# Patient Record
Sex: FEMALE | Race: WHITE | NOT HISPANIC OR LATINO | Employment: PART TIME | URBAN - METROPOLITAN AREA
[De-identification: names, ages, dates, MRNs, and addresses within clinical notes are randomized per-mention and may not be internally consistent; named-entity substitution may affect disease eponyms.]

---
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
## 2023-04-09 ENCOUNTER — OFFICE VISIT (OUTPATIENT)
Dept: URGENT CARE | Facility: PHYSICIAN GROUP | Age: 23
End: 2023-04-09
Payer: OTHER GOVERNMENT

## 2023-04-09 VITALS
RESPIRATION RATE: 18 BRPM | HEIGHT: 64 IN | TEMPERATURE: 98.8 F | WEIGHT: 165 LBS | OXYGEN SATURATION: 97 % | BODY MASS INDEX: 28.17 KG/M2 | DIASTOLIC BLOOD PRESSURE: 62 MMHG | SYSTOLIC BLOOD PRESSURE: 120 MMHG | HEART RATE: 100 BPM

## 2023-04-09 DIAGNOSIS — J02.9 PHARYNGITIS, UNSPECIFIED ETIOLOGY: ICD-10-CM

## 2023-04-09 LAB — S PYO DNA SPEC NAA+PROBE: NOT DETECTED

## 2023-04-09 PROCEDURE — 87651 STREP A DNA AMP PROBE: CPT | Performed by: PHYSICIAN ASSISTANT

## 2023-04-09 PROCEDURE — 99213 OFFICE O/P EST LOW 20 MIN: CPT | Performed by: PHYSICIAN ASSISTANT

## 2023-04-09 RX ORDER — AMOXICILLIN 875 MG/1
875 TABLET, COATED ORAL 2 TIMES DAILY
COMMUNITY
Start: 2023-01-20

## 2023-04-09 RX ORDER — DEXAMETHASONE SODIUM PHOSPHATE 10 MG/ML
10 INJECTION INTRAMUSCULAR; INTRAVENOUS ONCE
Status: COMPLETED | OUTPATIENT
Start: 2023-04-09 | End: 2023-04-09

## 2023-04-09 RX ADMIN — DEXAMETHASONE SODIUM PHOSPHATE 10 MG: 10 INJECTION INTRAMUSCULAR; INTRAVENOUS at 14:36

## 2023-04-09 ASSESSMENT — ENCOUNTER SYMPTOMS
ABDOMINAL PAIN: 0
EYE DISCHARGE: 0
COUGH: 0
VOMITING: 0
SORE THROAT: 1
DIARRHEA: 0
DIZZINESS: 0
CONSTIPATION: 0
EYE PAIN: 0
WHEEZING: 0
FEVER: 0
NAUSEA: 0
SHORTNESS OF BREATH: 0
CHILLS: 0
SINUS PAIN: 0
DIAPHORESIS: 0
EYE REDNESS: 0
HEADACHES: 0

## 2023-04-09 NOTE — PROGRESS NOTES
"  Subjective:     Adwoa Clements  is a 23 y.o. female who presents for Pharyngitis (X 3 days sore throat, white patches on throat, low grade fever)       She presents today with pharyngitis symptoms have been ongoing for last 3 days.  Has been experiencing a low-grade fever of  and symptom onset.  It was noted by her mother that there were exudates on the tonsils which did prompt the patient to come in for a strep test today.  Denies any recent close sick contacts.  No chest pain or shortness breath, no nausea or vomiting, no abdominal pain, no diarrhea.     Review of Systems   Constitutional:  Negative for chills, diaphoresis, fever and malaise/fatigue.   HENT:  Positive for sore throat. Negative for congestion, ear discharge and sinus pain.    Eyes:  Negative for pain, discharge and redness.   Respiratory:  Negative for cough, shortness of breath and wheezing.    Cardiovascular:  Negative for chest pain.   Gastrointestinal:  Negative for abdominal pain, constipation, diarrhea, nausea and vomiting.   Genitourinary:  Negative for dysuria, frequency and urgency.   Neurological:  Negative for dizziness and headaches.    Allergies   Allergen Reactions    Hydrocodone-Acetaminophen [Hydrocodone-Acetaminophen] Rash     Past Medical History:   Diagnosis Date    ASTHMA         Objective:   /62 (BP Location: Left arm, Patient Position: Sitting, BP Cuff Size: Adult)   Pulse 100   Temp 37.1 °C (98.8 °F)   Resp 18   Ht 1.626 m (5' 4\")   Wt 74.8 kg (165 lb)   SpO2 97%   BMI 28.32 kg/m²   Physical Exam  Vitals and nursing note reviewed.   Constitutional:       General: She is not in acute distress.     Appearance: Normal appearance. She is not ill-appearing, toxic-appearing or diaphoretic.   HENT:      Head: Normocephalic.      Right Ear: Tympanic membrane, ear canal and external ear normal. There is no impacted cerumen.      Left Ear: Tympanic membrane, ear canal and external ear normal. There is no " impacted cerumen.      Nose: No congestion or rhinorrhea.      Mouth/Throat:      Mouth: Mucous membranes are moist.      Pharynx: Posterior oropharyngeal erythema present. No oropharyngeal exudate.   Eyes:      General:         Right eye: No discharge.         Left eye: No discharge.      Conjunctiva/sclera: Conjunctivae normal.   Cardiovascular:      Rate and Rhythm: Normal rate and regular rhythm.   Pulmonary:      Effort: Pulmonary effort is normal. No respiratory distress.      Breath sounds: Normal breath sounds. No stridor. No wheezing or rhonchi.   Musculoskeletal:      Cervical back: Neck supple.   Lymphadenopathy:      Cervical: No cervical adenopathy.   Neurological:      General: No focal deficit present.      Mental Status: She is alert and oriented to person, place, and time.   Psychiatric:         Mood and Affect: Mood normal.         Behavior: Behavior normal.         Thought Content: Thought content normal.         Judgment: Judgment normal.           Diagnostic testing:    Cephid Strep -negative, patient contacted via phone call    Assessment/Plan:     Encounter Diagnoses   Name Primary?    Pharyngitis, unspecified etiology           Plan for care for today's complaint includes 10 mg oral Decadron in office today.  Continue over-the-counter medications for additional symptom support.  Strep test was negative today, no evidence to support antibiotic use at this time.  Continue to monitor symptoms and return to urgent care or follow-up with primary care provider if symptoms remain ongoing.  Follow-up in the emergency department if symptoms become severe, ER precautions discussed in office today..    See AVS Instructions below for written guidance provided to patient on after-visit management and care in addition to our verbal discussion during the visit.    Please note that this dictation was created using voice recognition software. I have attempted to correct all errors, but there may be  sound-alike, spelling, grammar and possibly content errors that I did not discover before finalizing the note.    Antoine Kristyn BARAKAT

## 2023-10-05 ENCOUNTER — GYNECOLOGY VISIT (OUTPATIENT)
Dept: OBGYN | Facility: CLINIC | Age: 23
End: 2023-10-05
Payer: OTHER GOVERNMENT

## 2023-10-05 ENCOUNTER — HOSPITAL ENCOUNTER (OUTPATIENT)
Facility: MEDICAL CENTER | Age: 23
End: 2023-10-05
Attending: STUDENT IN AN ORGANIZED HEALTH CARE EDUCATION/TRAINING PROGRAM
Payer: OTHER GOVERNMENT

## 2023-10-05 VITALS — DIASTOLIC BLOOD PRESSURE: 85 MMHG | BODY MASS INDEX: 27.98 KG/M2 | WEIGHT: 163 LBS | SYSTOLIC BLOOD PRESSURE: 122 MMHG

## 2023-10-05 DIAGNOSIS — Z12.4 SCREENING FOR MALIGNANT NEOPLASM OF CERVIX: ICD-10-CM

## 2023-10-05 DIAGNOSIS — Z01.419 ENCOUNTER FOR GYNECOLOGICAL EXAMINATION (GENERAL) (ROUTINE) WITHOUT ABNORMAL FINDINGS: ICD-10-CM

## 2023-10-05 PROCEDURE — 87491 CHLMYD TRACH DNA AMP PROBE: CPT

## 2023-10-05 PROCEDURE — 88142 CYTOPATH C/V THIN LAYER: CPT

## 2023-10-05 PROCEDURE — 3074F SYST BP LT 130 MM HG: CPT | Performed by: STUDENT IN AN ORGANIZED HEALTH CARE EDUCATION/TRAINING PROGRAM

## 2023-10-05 PROCEDURE — 87591 N.GONORRHOEAE DNA AMP PROB: CPT

## 2023-10-05 PROCEDURE — 3079F DIAST BP 80-89 MM HG: CPT | Performed by: STUDENT IN AN ORGANIZED HEALTH CARE EDUCATION/TRAINING PROGRAM

## 2023-10-05 PROCEDURE — 99385 PREV VISIT NEW AGE 18-39: CPT | Performed by: STUDENT IN AN ORGANIZED HEALTH CARE EDUCATION/TRAINING PROGRAM

## 2023-10-05 NOTE — PROGRESS NOTES
ANNUAL GYNECOLOGY VISIT    Chief Complaint  New Patient      Subjective  Adwoa Clements is a 23 y.o. G0 with LMP 09/18/2023 not using contraception who presents today for Annual Exam.  She has never had a pap before.  She reports regular menses q28 days lasting 3 days with light-moderate flow.  No complaints about abdominal/pelvic cramping.  She is currently in a monogamous relationship and not using condoms but has in the past.  She has completed the HPV vaccination series.    Preventive Care   Immunization History   Administered Date(s) Administered    9VHPV VACCINE 2-3 DOSE IM (GARDASIL 9) 01/18/2018, 07/13/2018    Dtap Vaccine 2001, 2001, 2001, 04/15/2002, 03/09/2005    Hepatitis A Vaccine, Ped/Adol 03/09/2005, 05/31/2006    Hepatitis B Vaccine Adolescent/Pediatric 2001, 2001, 2001    Hib Vaccine (Prp-d) - HISTORICAL DATA 2001, 2001, 2001, 04/15/2002    IPV 2001, 2001, 04/15/2002, 03/09/2005    Influenza Vaccine Adult HD 11/15/2017    Influenza Vaccine Pediatric Split - Historical Data 12/04/2003, 10/15/2004, 11/01/2005, 11/02/2006, 01/15/2009    Influenza Vaccine Quad Inj (Preserved) 10/03/2017    Influenza, Unspecified - HISTORICAL DATA 10/26/2018    MENING VAC SERO B 2 DOSE SCHED IM (BEXSERO) 06/13/2019, 07/13/2019    MMR Vaccine 01/14/2002, 03/09/2005    MODERNA SARS-COV-2 VACCINE (12+) 10/04/2021    Meningococcal Conjugate Vaccine MCV4 (Menactra) 01/18/2018    Tdap Vaccine 07/30/2013    Tuberculin Skin Test 01/19/2019    Varicella Vaccine Live 01/14/2002, 01/15/2008     Last Mammogram: N/A    Gynecology History and ROS  Current Sexual Activity: Yes  History of sexually transmitted diseases?  no  Abnormal discharge? No  Current Contraception:  None    Menstrual History  Patient's last menstrual period was 09/18/2023 (exact date).  Periods are regular  q 28 days, lasting 3 days.   Clots or heavy flow: No  Dysmenorrhea: No  Intermenstrual  bleeding/spotting: No  Significant pain with periods:No  Bothersome PMS symptoms: No  Significant Pelvic Pain: No      Pap History  Last pap smear: Never - due today  History of moderate or severe dysplasia: No    Cancer Risk Assessement:  Family history of:   - Breast cancer: yes - grandmother diagnosed 3 months ago, genetic testing pending   - Ovarian cancer: no   - Uterine cancer: no   - Colon cancer: no    Obstetric History  OB History    Para Term  AB Living   0 0 0 0 0 0   SAB IAB Ectopic Molar Multiple Live Births   0 0 0 0 0 0       Past Medical History  Past Medical History:   Diagnosis Date    ASTHMA        Past Surgical History  Past Surgical History:   Procedure Laterality Date    TONSILLECTOMY AND ADENOIDECTOMY  2011    Performed by JUAN ANTONIO TILLMAN at SURGERY SAME DAY [facility] ORS       Social History  Social History     Socioeconomic History    Marital status: Single     Spouse name: Not on file    Number of children: Not on file    Years of education: Not on file    Highest education level: Not on file   Occupational History    Not on file   Tobacco Use    Smoking status: Never    Smokeless tobacco: Never   Vaping Use    Vaping Use: Former   Substance and Sexual Activity    Alcohol use: No    Drug use: No    Sexual activity: Yes     Partners: Male     Birth control/protection: None   Other Topics Concern    Behavioral problems Not Asked    Interpersonal relationships Not Asked    Sad or not enjoying activities Not Asked    Suicidal thoughts Not Asked    Poor school performance Not Asked    Reading difficulties Not Asked    Speech difficulties Not Asked    Writing difficulties Not Asked    Inadequate sleep Not Asked    Excessive TV viewing Not Asked    Excessive video game use Not Asked    Inadequate exercise Not Asked    Sports related Not Asked    Poor diet Not Asked    Family concerns for drug/alcohol abuse Not Asked    Poor oral hygiene Not Asked    Bike safety Not Asked     Family concerns vehicle safety Not Asked   Social History Narrative    Not on file     Social Determinants of Health     Financial Resource Strain: Not on file   Food Insecurity: Not on file   Transportation Needs: Not on file   Physical Activity: Not on file   Stress: Not on file   Social Connections: Not on file   Intimate Partner Violence: Not on file   Housing Stability: Not on file       Family History  History reviewed. No pertinent family history.    Home Medications  Current Outpatient Medications on File Prior to Visit   Medication Sig Dispense Refill    amoxicillin (AMOXIL) 875 MG tablet Take 875 mg by mouth 2 times a day. (Patient not taking: Reported on 10/5/2023)       No current facility-administered medications on file prior to visit.       Allergies/Reactions  Allergies   Allergen Reactions    Hydrocodone-Acetaminophen [Hydrocodone-Acetaminophen] Rash       ROS  Positive ROS: none  Gen: no fevers or chills, no significant weight loss or gain, excessive fatigue  Respiratory:  no cough or dyspnea  Cardiac:  no chest pain, no palpitations, no syncope  Breast: no breast discharge, pain, lump or skin changes  GI:  no heartburn, no abdominal pain, no nausea or vomiting  Urinary: no dysuria, urgency, frequency, incontinence   Psych: no depression or anxiety  Neuro: no migraines with aura, fainting spells, numbness or tingling  Extremities: no joint pain, persistently swollen ankles, recurrent leg cramps      Physical Examination:  Vital Signs:   Vitals:    10/05/23 0805   BP: 122/85   Weight: 163 lb     Body mass index is 27.98 kg/m².    Constitutional: The patient is well developed and well nourished.  Psychiatric: Patient is oriented to time place and person.   Skin: No rash observed.  Neck: Appears symmetric. Thyroid normal size  Respiratory: normal effort  Breast: Inspection reveals no asymetry or nipple discharge, no skin thickening, dimpling or erythema.  Palpation demonstrates no masses.  Abdomen:  Soft, non-tender.  Pelvic Exam:     Vulva: external female genitalia are normal in appearance. No lesions    Urethra - no lesions, no erythema    Vagina: moist, pink, normal ruggae    Cervix: pink, smooth, no lesions, no CMT    Uterus - non-tender, normal size, shape, contour, mobile, anteverted    Ovaries: non-tender, no appreciable masses  Pap Smear performed: Yes  Extremeties: Legs are symmetric and without tenderness. There is no edema present.    Labs/Imaging:  No new labs or imaging        Assessment & Plan  Adwoa Clements is a 23 y.o. G0 with LMP 09/18/2023 not using contraception who presents today for Annual Gyn Exam.     1. Encounter for gynecological examination (general) (routine) without abnormal findings  -- pelvic exam benign  -- pap with GC/CT collected today  -- has completed HPV vaccination series  -- declines contraception  -- genetics for her grandmother recently diagnosed with breast cancer are pending  -- all questions answered  -- return annually or PRN    2. Screening for malignant neoplasm of cervix  -- see above  - THINPREP PAP W/CTNG; Future      Return: Annually or PRN    Perfecto Santos D.O.

## 2023-10-09 LAB
C TRACH RRNA CVX QL NAA+PROBE: NEGATIVE
CYTOLOGIST CVX/VAG CYTO: ABNORMAL
CYTOLOGY CVX/VAG DOC CYTO: ABNORMAL
DX ICD CODE: ABNORMAL
N GONORRHOEA RRNA CVX QL NAA+PROBE: NEGATIVE
NOTE NL11727A: ABNORMAL
OTHER STN SPEC: ABNORMAL
PATHOLOGIST CVX/VAG CYTO: ABNORMAL
STAT OF ADQ CVX/VAG CYTO-IMP: ABNORMAL

## 2024-05-02 ENCOUNTER — EH NON-PROVIDER (OUTPATIENT)
Dept: OCCUPATIONAL MEDICINE | Facility: CLINIC | Age: 24
End: 2024-05-02

## 2024-05-02 ENCOUNTER — HOSPITAL ENCOUNTER (OUTPATIENT)
Facility: MEDICAL CENTER | Age: 24
End: 2024-05-02
Attending: PREVENTIVE MEDICINE
Payer: COMMERCIAL

## 2024-05-02 DIAGNOSIS — Z02.89 ENCOUNTER FOR OCCUPATIONAL HEALTH ASSESSMENT: ICD-10-CM

## 2024-05-02 DIAGNOSIS — Z02.1 PRE-EMPLOYMENT DRUG SCREENING: ICD-10-CM

## 2024-05-02 LAB
AMP AMPHETAMINE: NORMAL
BAR BARBITURATES: NORMAL
BZO BENZODIAZEPINES: NORMAL
COC COCAINE: NORMAL
INT CON NEG: NORMAL
INT CON POS: NORMAL
MDMA ECSTASY: NORMAL
MET METHAMPHETAMINES: NORMAL
MTD METHADONE: NORMAL
OPI OPIATES: NORMAL
OXY OXYCODONE: NORMAL
PCP PHENCYCLIDINE: NORMAL
POC URINE DRUG SCREEN OCDRS: NEGATIVE
THC: NORMAL

## 2024-05-02 PROCEDURE — 80305 DRUG TEST PRSMV DIR OPT OBS: CPT | Performed by: PREVENTIVE MEDICINE

## 2024-05-02 PROCEDURE — 86480 TB TEST CELL IMMUN MEASURE: CPT | Performed by: PREVENTIVE MEDICINE

## 2024-05-02 PROCEDURE — 90715 TDAP VACCINE 7 YRS/> IM: CPT | Performed by: NURSE PRACTITIONER

## 2024-05-06 LAB
GAMMA INTERFERON BACKGROUND BLD IA-ACNC: 0.03 IU/ML
M TB IFN-G BLD-IMP: NEGATIVE
M TB IFN-G CD4+ BCKGRND COR BLD-ACNC: 0.01 IU/ML
MITOGEN IGNF BCKGRD COR BLD-ACNC: >10 IU/ML
QFT TB2 - NIL TBQ2: 0 IU/ML

## 2024-06-04 NOTE — NON-PROVIDER
Adwoa Clements is a 18 y.o. female here for a non-provider visit for PPD reading -- Step 1 of 1.      1.  Resulted in Epic under enter/edit results? Yes   2.  TB evaluation questionnaire scanned into chart and original given to patient?Yes      3. Was induration greater than 0 mm? No.    Routed to PCP? No        Statement Selected

## 2024-07-14 ENCOUNTER — OFFICE VISIT (OUTPATIENT)
Dept: URGENT CARE | Facility: CLINIC | Age: 24
End: 2024-07-14
Payer: COMMERCIAL

## 2024-07-14 VITALS
HEART RATE: 99 BPM | RESPIRATION RATE: 16 BRPM | DIASTOLIC BLOOD PRESSURE: 78 MMHG | WEIGHT: 160 LBS | SYSTOLIC BLOOD PRESSURE: 130 MMHG | TEMPERATURE: 98 F | HEIGHT: 64 IN | OXYGEN SATURATION: 97 % | BODY MASS INDEX: 27.31 KG/M2

## 2024-07-14 DIAGNOSIS — J02.9 SORE THROAT: ICD-10-CM

## 2024-07-14 PROCEDURE — 3075F SYST BP GE 130 - 139MM HG: CPT | Performed by: REGISTERED NURSE

## 2024-07-14 PROCEDURE — 99213 OFFICE O/P EST LOW 20 MIN: CPT | Performed by: REGISTERED NURSE

## 2024-07-14 PROCEDURE — 3078F DIAST BP <80 MM HG: CPT | Performed by: REGISTERED NURSE

## 2024-07-14 RX ORDER — LIDOCAINE HYDROCHLORIDE 20 MG/ML
15 SOLUTION OROPHARYNGEAL EVERY 4 HOURS PRN
Qty: 120 ML | Refills: 0 | Status: SHIPPED | OUTPATIENT
Start: 2024-07-14

## 2024-07-14 RX ORDER — AMOXICILLIN 500 MG/1
500 CAPSULE ORAL 2 TIMES DAILY
Qty: 20 CAPSULE | Refills: 0 | Status: SHIPPED | OUTPATIENT
Start: 2024-07-14 | End: 2024-07-24

## 2024-07-14 RX ORDER — DEXAMETHASONE SODIUM PHOSPHATE 10 MG/ML
10 INJECTION INTRAMUSCULAR; INTRAVENOUS ONCE
Status: COMPLETED | OUTPATIENT
Start: 2024-07-14 | End: 2024-07-14

## 2024-07-14 RX ADMIN — DEXAMETHASONE SODIUM PHOSPHATE 10 MG: 10 INJECTION INTRAMUSCULAR; INTRAVENOUS at 12:27

## 2024-07-14 ASSESSMENT — ENCOUNTER SYMPTOMS
CHILLS: 0
FEVER: 0
ABDOMINAL PAIN: 0
SHORTNESS OF BREATH: 0
DIZZINESS: 0

## 2024-09-19 ENCOUNTER — HOSPITAL ENCOUNTER (OUTPATIENT)
Facility: MEDICAL CENTER | Age: 24
End: 2024-09-19
Attending: FAMILY MEDICINE
Payer: COMMERCIAL

## 2024-09-19 ENCOUNTER — GYNECOLOGY VISIT (OUTPATIENT)
Dept: OBGYN | Facility: CLINIC | Age: 24
End: 2024-09-19
Payer: COMMERCIAL

## 2024-09-19 VITALS
HEIGHT: 65 IN | DIASTOLIC BLOOD PRESSURE: 91 MMHG | SYSTOLIC BLOOD PRESSURE: 141 MMHG | WEIGHT: 156.5 LBS | BODY MASS INDEX: 26.08 KG/M2

## 2024-09-19 DIAGNOSIS — R87.610 ATYPICAL SQUAMOUS CELLS OF UNDETERMINED SIGNIFICANCE (ASCUS) ON PAPANICOLAOU SMEAR OF CERVIX: ICD-10-CM

## 2024-09-19 DIAGNOSIS — Z01.419 WELL WOMAN EXAM WITH ROUTINE GYNECOLOGICAL EXAM: Primary | ICD-10-CM

## 2024-09-19 DIAGNOSIS — Z11.3 ROUTINE SCREENING FOR STI (SEXUALLY TRANSMITTED INFECTION): ICD-10-CM

## 2024-09-19 DIAGNOSIS — Z13.29 SCREENING FOR THYROID DISORDER: ICD-10-CM

## 2024-09-19 DIAGNOSIS — Z31.69 ENCOUNTER FOR PRECONCEPTION CONSULTATION: ICD-10-CM

## 2024-09-19 DIAGNOSIS — Z12.4 CERVICAL CANCER SCREENING: ICD-10-CM

## 2024-09-19 DIAGNOSIS — Z13.0 SCREENING FOR DEFICIENCY ANEMIA: ICD-10-CM

## 2024-09-19 DIAGNOSIS — Z23 NEED FOR HPV VACCINE: ICD-10-CM

## 2024-09-19 DIAGNOSIS — Z80.3 FAMILY HISTORY OF BREAST CANCER IN FEMALE: ICD-10-CM

## 2024-09-19 LAB — AMBIGUOUS DTTM AMBI4: NORMAL

## 2024-09-19 PROCEDURE — 87491 CHLMYD TRACH DNA AMP PROBE: CPT

## 2024-09-19 PROCEDURE — 87591 N.GONORRHOEAE DNA AMP PROB: CPT

## 2024-09-19 PROCEDURE — 87661 TRICHOMONAS VAGINALIS AMPLIF: CPT

## 2024-09-19 PROCEDURE — 88175 CYTOPATH C/V AUTO FLUID REDO: CPT

## 2024-09-19 ASSESSMENT — PATIENT HEALTH QUESTIONNAIRE - PHQ9: CLINICAL INTERPRETATION OF PHQ2 SCORE: 0

## 2024-09-19 NOTE — PROGRESS NOTES
Well Woman Examination    ID: Adwoa Clements is 24 y.o.  here for well woman exam and follow up PAP.    Subjective     CC:    Chief Complaint   Patient presents with    Gynecologic Exam     HPI:   Pt had first cervical cancer screening 10/5/2023 and was ASCUS without reflex HPV testing. Per ASCCP recommendation is repeat PAP in one year, which we will collect today.    ROS:  Gen: denies fevers/chills, denies changes in weight  Pulm: denies shortness of breath, denies cough  CV: denies chest pain, denies palpitations  GI: denies nausea/vomiting, denies diarrhea or constipation  : denies dysuria, denies vaginal discharge or odor  Skin: denies rash    Depression Screening  Little interest or pleasure in doing things?  0 - not at all   Feeling down, depressed , or hopeless? 0 - not at all     History     Patient Active Problem List   Diagnosis    Atypical squamous cells of undetermined significance (ASCUS) on Papanicolaou smear of cervix        Past Medical History:   Diagnosis Date    ASTHMA         Past Surgical History:   Procedure Laterality Date    TONSILLECTOMY AND ADENOIDECTOMY  2011    Performed by JUAN ANTONIO TILLMAN at SURGERY SAME DAY [facility] ORS        No current outpatient medications on file.        Allergies   Allergen Reactions    Hydrocodone-Acetaminophen [Hydrocodone-Acetaminophen] Rash       Social Determinants of Health     Alcohol Use: Not on file   Depression: Not at risk (2024)    PHQ-2     PHQ-2 Score: 0   Financial Resource Strain: Not on file   Food Insecurity: Not on file   Housing Stability: Not on file   Intimate Partner Violence: Not on file   Physical Activity: Not on file   Social Connections: Not on file   Stress: Not on file   Tobacco Use: Low Risk  (2024)    Patient History     Smoking Tobacco Use: Never     Smokeless Tobacco Use: Never     Passive Exposure: Not on file   Transportation Needs: Not on file   Utilities: Not on file        OB History    Para  "Term  AB Living   0 0 0 0 0 0   SAB IAB Ectopic Molar Multiple Live Births   0 0 0 0 0 0        History reviewed. No pertinent family history.     FH7:   Did any first degree relatives have breast or ovarian cancer? no  Did any of your relatives have bilateral breast cancer? unsure  Did any man in your family have breast cancer? no  Did any woman in your family have breast and ovarian cancer? Maternal Grandmother approx 61yo  Did any woman in your family have breast cancer before age 50 years? no  Do you have two ore more relatives with breast and/or ovarian cancer? no  Do you have two or more relatives with breast and/or bowel cancer? no    Objective:     BP (!) 141/91   Ht 5' 5\"   Wt 156 lb 8 oz   LMP 2024 (Approximate)   BMI 26.04 kg/m²     Gen: Alert and oriented, No apparent distress.  Lungs: Breathing comfortably on room air, no cough  CV: Extremities are warm and well perfused, no BLE edema  MSK: Normal movement of extremities, gait normal    :  Vulva: normal.    Urethra: normal.   Vagina: normal.    Cervix: normal.    Uterus: normal   Adnexa: not palpable   Perineum: normal.    Chaperone name Piotr, medical assistant, was present for exam.    Assessment & Plan:     Adwoa Clements is 24 y.o.  here for well woman exam and follow up PAP.    #blood pressure screening, elevated today due to being nervous  - see vital signs above  - no dx of hypertension, several BP within normal limits in other appointments    #anxiety/depression screening  - PHQ2 = 0    #ASCUS without cotesting  - repeat screening collected today  - complete HPV vaccine series, received first two doses at 17 yo    #cervical cancer screening  - age 21 - 65  - PAP collected today  - if PAP is normal/negative next one will be in 3 years    #STI screening  - recommended screening includes: HIV, syphilis, gonorrhea/chlamydia, hepatitis B and C  - collected today    #contraception, declined  - emergency contraception " offered    #pre-conception counseling  - recommend prenatal vitamin with folic acid for at least three months prior to pregnancy    #breast cancer screening  - mammogram annually after 40 to 74 years of age (USPSTF grade B)  - FH7 score 2 (1 or more positive response = refer to genetics)     #skin cancer screening  - recommend whole body screening by primary care doctor or dermatology    #substance use screening, denies x3    #lung cancer screening  - if older than 50yr and >20yr/pk history  - please discuss with your primary care doctor    #Immunizations  - annual flu vaccine recommended  - COVID vaccines and boosters recommended  - HPV vaccine series recommended, if not already completed    1. Cervical cancer screening  - PAP LB, CT-NG TV RFX HPV ASCU    2. Atypical squamous cells of undetermined significance (ASCUS) on Papanicolaou smear of cervix  - PAP LB, CT-NG TV RFX HPV ASCU  - 9VHPV Vaccine 2-3 Dose IM    3. Need for HPV vaccine  - Consent for HPV  - 9VHPV Vaccine 2-3 Dose IM    4. Family history of breast cancer in female  - Referral to Genetics    5. Screening for deficiency anemia  - CBC WITHOUT DIFFERENTIAL; Future    6. Screening for thyroid disorder  - TSH+FREE T4    7. Routine screening for STI (sexually transmitted infection)  - HIV AG/AB COMBO ASSAY SCREENING; Future  - RPR (SYPHILIS); Future  - HEP C VIRUS ANTIBODY; Future  - HEP B SURFACE ANTIGEN; Future    8. Encounter for preconception consultation  - VITAMIN D,25 HYDROXY (DEFICIENCY); Future       Return to clinic annually for well woman exam.    Guillermina Wilkinson MD, MPH

## 2024-09-20 ENCOUNTER — HOSPITAL ENCOUNTER (OUTPATIENT)
Dept: LAB | Facility: MEDICAL CENTER | Age: 24
End: 2024-09-20
Attending: FAMILY MEDICINE
Payer: COMMERCIAL

## 2024-09-20 DIAGNOSIS — Z31.69 ENCOUNTER FOR PRECONCEPTION CONSULTATION: ICD-10-CM

## 2024-09-20 DIAGNOSIS — Z13.0 SCREENING FOR DEFICIENCY ANEMIA: ICD-10-CM

## 2024-09-20 DIAGNOSIS — Z11.3 ROUTINE SCREENING FOR STI (SEXUALLY TRANSMITTED INFECTION): ICD-10-CM

## 2024-09-20 LAB
ERYTHROCYTE [DISTWIDTH] IN BLOOD BY AUTOMATED COUNT: 39.1 FL (ref 35.9–50)
HCT VFR BLD AUTO: 39.9 % (ref 37–47)
HGB BLD-MCNC: 13.7 G/DL (ref 12–16)
MCH RBC QN AUTO: 30.4 PG (ref 27–33)
MCHC RBC AUTO-ENTMCNC: 34.3 G/DL (ref 32.2–35.5)
MCV RBC AUTO: 88.7 FL (ref 81.4–97.8)
PLATELET # BLD AUTO: 246 K/UL (ref 164–446)
PMV BLD AUTO: 10.3 FL (ref 9–12.9)
RBC # BLD AUTO: 4.5 M/UL (ref 4.2–5.4)
WBC # BLD AUTO: 8 K/UL (ref 4.8–10.8)

## 2024-09-20 PROCEDURE — 82306 VITAMIN D 25 HYDROXY: CPT

## 2024-09-20 PROCEDURE — 84443 ASSAY THYROID STIM HORMONE: CPT

## 2024-09-20 PROCEDURE — 36415 COLL VENOUS BLD VENIPUNCTURE: CPT

## 2024-09-20 PROCEDURE — 87340 HEPATITIS B SURFACE AG IA: CPT

## 2024-09-20 PROCEDURE — 85027 COMPLETE CBC AUTOMATED: CPT

## 2024-09-20 PROCEDURE — 86780 TREPONEMA PALLIDUM: CPT

## 2024-09-20 PROCEDURE — 84439 ASSAY OF FREE THYROXINE: CPT

## 2024-09-20 PROCEDURE — 86803 HEPATITIS C AB TEST: CPT

## 2024-09-20 PROCEDURE — 87389 HIV-1 AG W/HIV-1&-2 AB AG IA: CPT

## 2024-09-21 LAB
25(OH)D3 SERPL-MCNC: 27 NG/ML (ref 30–100)
HBV SURFACE AG SER QL: NORMAL
HCV AB SER QL: NORMAL
HIV 1+2 AB+HIV1 P24 AG SERPL QL IA: NORMAL
T4 FREE SERPL-MCNC: 1.3 NG/DL (ref 0.93–1.7)
TSH SERPL-ACNC: 1.65 UIU/ML (ref 0.35–5.5)

## 2024-09-22 LAB
SPEC CONTAINER SPEC: NORMAL
SPECIMEN SOURCE: NORMAL
T PALLIDUM AB SER QL IA: NORMAL
T VAGINALIS RRNA SPEC QL NAA+PROBE: NEGATIVE

## 2024-09-25 LAB
C TRACH RRNA CVX QL NAA+PROBE: NEGATIVE
COMMENT NL11729A: NORMAL
CYTOLOGIST CVX/VAG CYTO: NORMAL
CYTOLOGY CVX/VAG DOC CYTO: NORMAL
CYTOLOGY CVX/VAG DOC THIN PREP: NORMAL
N GONORRHOEA RRNA CVX QL NAA+PROBE: NEGATIVE
NOTE NL11727A: NORMAL
OTHER STN SPEC: NORMAL
STAT OF ADQ CVX/VAG CYTO-IMP: NORMAL

## 2025-01-03 ENCOUNTER — OFFICE VISIT (OUTPATIENT)
Dept: URGENT CARE | Facility: CLINIC | Age: 25
End: 2025-01-03
Payer: COMMERCIAL

## 2025-01-03 VITALS
BODY MASS INDEX: 25.85 KG/M2 | WEIGHT: 151.4 LBS | SYSTOLIC BLOOD PRESSURE: 130 MMHG | HEIGHT: 64 IN | HEART RATE: 108 BPM | TEMPERATURE: 101.4 F | OXYGEN SATURATION: 96 % | DIASTOLIC BLOOD PRESSURE: 70 MMHG | RESPIRATION RATE: 16 BRPM

## 2025-01-03 DIAGNOSIS — J10.1 INFLUENZA A: ICD-10-CM

## 2025-01-03 DIAGNOSIS — J06.9 UPPER RESPIRATORY TRACT INFECTION, UNSPECIFIED TYPE: ICD-10-CM

## 2025-01-03 LAB
FLUAV RNA SPEC QL NAA+PROBE: POSITIVE
FLUBV RNA SPEC QL NAA+PROBE: NEGATIVE
RSV RNA SPEC QL NAA+PROBE: NEGATIVE
SARS-COV-2 RNA RESP QL NAA+PROBE: NEGATIVE

## 2025-01-03 PROCEDURE — 99214 OFFICE O/P EST MOD 30 MIN: CPT

## 2025-01-03 PROCEDURE — 3078F DIAST BP <80 MM HG: CPT

## 2025-01-03 PROCEDURE — 3075F SYST BP GE 130 - 139MM HG: CPT

## 2025-01-03 PROCEDURE — 0241U POCT CEPHEID COV-2, FLU A/B, RSV - PCR: CPT

## 2025-01-03 RX ORDER — OSELTAMIVIR PHOSPHATE 75 MG/1
75 CAPSULE ORAL 2 TIMES DAILY
Qty: 10 CAPSULE | Refills: 0 | Status: SHIPPED | OUTPATIENT
Start: 2025-01-03

## 2025-01-03 NOTE — LETTER
January 3, 2025    To Whom It May Concern:         This is confirmation that Adwoa Moon Tyree attended her scheduled appointment with PAGE Terrazas on 1/03/25.  May return 1/8/24         If you have any questions please do not hesitate to call me at the phone number listed below.    Sincerely,          SUDHA Terrazas.  [phone]

## 2025-01-04 NOTE — PROGRESS NOTES
"Chief Complaint   Patient presents with    Fever     Last night: Fever, chills, body aches, headache, cough that causes sore throat, congestion.          Subjective:   HISTORY OF PRESENT ILLNESS: Adwoa Clements is a 24 y.o. female who presents for fever , chills, body aches and headache since last night. Also complains of cough.   Patient denies SOB    Medications, Allergies, current problem list, Social and Family history reviewed today in Epic.     Objective:     /70   Pulse (!) 150   Temp (!) 38.6 °C (101.4 °F)   Resp 16   Ht 1.626 m (5' 4\")   Wt 68.7 kg (151 lb 6.4 oz)   SpO2 96%     Physical Exam  Vitals reviewed.   Constitutional:       Appearance: Normal appearance. She is not toxic-appearing.   HENT:      Head:      Jaw: No trismus.      Right Ear: Tympanic membrane normal.      Left Ear: Tympanic membrane normal.      Nose: Rhinorrhea present.      Mouth/Throat:      Mouth: Mucous membranes are moist.      Pharynx: Uvula midline. Posterior oropharyngeal erythema present. No pharyngeal swelling, oropharyngeal exudate or uvula swelling.      Tonsils: No tonsillar exudate or tonsillar abscesses. 1+ on the right. 1+ on the left.      Comments: No muffled voice, trismus, unilateral deviation of the uvula, soft palate fullness or edema. No oral airway compromise, or drooling noted.   Eyes:      Conjunctiva/sclera: Conjunctivae normal.   Cardiovascular:      Rate and Rhythm: Normal rate and regular rhythm.      Heart sounds: Normal heart sounds.   Pulmonary:      Effort: Pulmonary effort is normal. No respiratory distress.      Breath sounds: Normal breath sounds. No decreased breath sounds or wheezing.   Musculoskeletal:      Cervical back: Full passive range of motion without pain and neck supple.   Skin:     General: Skin is warm and dry.   Neurological:      Mental Status: She is alert and oriented to person, place, and time.   Psychiatric:         Mood and Affect: Mood normal.      "     Assessment/Plan:     Diagnosis and associated orders    I personally reviewed prior external notes and test results pertinent to today's visit.     1. Upper respiratory tract infection, unspecified type  POCT CoV-2, Flu A/B, RSV by PCR    oseltamivir (TAMIFLU) 75 MG Cap      2. Influenza A          Results for orders placed or performed in visit on 01/03/25   POCT CoV-2, Flu A/B, RSV by PCR    Collection Time: 01/03/25  6:20 PM   Result Value Ref Range    SARS-CoV-2 by PCR Negative Negative, Invalid    Influenza virus A RNA Positive (A) Negative, Invalid    Influenza virus B, PCR Negative Negative, Invalid    RSV, PCR Negative Negative, Invalid          IMPRESSION: The patient is well appearing here with reassuring exam and vitals signs. Systemically she is tachycardic, likely from her viral illness, do not suspect sepsis at this time. Symptomatology is consistent with a viral illness and are self limiting.  Informed that no antibiotics are indicated at this time.  Recommended supportive therapies and preferred OTC medications for symptomatic relief   They are discharged home with a course of tamiflu.   Advised to stay hydrated.  Discussed anticipated duration of illness, return to work/school, and indications to RTC or go to the Emergency department.    Patient states understanding of the plan of care and discharge instructions.  They are discharged in stable condition.         Please note that this dictation was created using voice recognition software. I have made a reasonable attempt to correct obvious errors, but I expect that there are errors of grammar and possibly content that I did not discover before finalizing the note.    This note was electronically signed by PAGE Terrazas

## 2025-01-07 ENCOUNTER — OFFICE VISIT (OUTPATIENT)
Dept: URGENT CARE | Facility: CLINIC | Age: 25
End: 2025-01-07
Payer: COMMERCIAL

## 2025-01-07 VITALS
WEIGHT: 150 LBS | OXYGEN SATURATION: 98 % | HEART RATE: 92 BPM | TEMPERATURE: 98.3 F | RESPIRATION RATE: 18 BRPM | SYSTOLIC BLOOD PRESSURE: 104 MMHG | HEIGHT: 65 IN | BODY MASS INDEX: 24.99 KG/M2 | DIASTOLIC BLOOD PRESSURE: 66 MMHG

## 2025-01-07 DIAGNOSIS — R09.81 SINUS CONGESTION: ICD-10-CM

## 2025-01-07 DIAGNOSIS — H60.501 ACUTE OTITIS EXTERNA OF RIGHT EAR, UNSPECIFIED TYPE: ICD-10-CM

## 2025-01-07 PROCEDURE — 3074F SYST BP LT 130 MM HG: CPT

## 2025-01-07 PROCEDURE — 3078F DIAST BP <80 MM HG: CPT

## 2025-01-07 PROCEDURE — 99213 OFFICE O/P EST LOW 20 MIN: CPT

## 2025-01-07 RX ORDER — PSEUDOEPHEDRINE HCL 30 MG/1
60 TABLET, FILM COATED ORAL EVERY 6 HOURS PRN
Qty: 20 TABLET | Refills: 0 | Status: SHIPPED | OUTPATIENT
Start: 2025-01-07

## 2025-01-07 RX ORDER — FLUTICASONE PROPIONATE 50 MCG
1 SPRAY, SUSPENSION (ML) NASAL 2 TIMES DAILY
Qty: 9.9 ML | Refills: 0 | Status: SHIPPED | OUTPATIENT
Start: 2025-01-07

## 2025-01-07 RX ORDER — CIPROFLOXACIN AND DEXAMETHASONE 3; 1 MG/ML; MG/ML
4 SUSPENSION/ DROPS AURICULAR (OTIC) 2 TIMES DAILY
Qty: 7.5 ML | Refills: 0 | Status: SHIPPED | OUTPATIENT
Start: 2025-01-07 | End: 2025-01-14

## 2025-01-07 RX ORDER — CETIRIZINE HYDROCHLORIDE 10 MG/1
10 TABLET ORAL DAILY
Qty: 30 TABLET | Refills: 1 | Status: SHIPPED | OUTPATIENT
Start: 2025-01-07

## 2025-01-07 ASSESSMENT — ENCOUNTER SYMPTOMS
NAUSEA: 0
PALPITATIONS: 0
CHILLS: 0
COUGH: 0
WEAKNESS: 0
BLURRED VISION: 0
TINGLING: 0
DIARRHEA: 0
VOMITING: 0
DIZZINESS: 0
FEVER: 0
SORE THROAT: 0
SINUS PAIN: 0
SHORTNESS OF BREATH: 0
DOUBLE VISION: 0
HEADACHES: 0
MYALGIAS: 0

## 2025-01-07 NOTE — PROGRESS NOTES
Subjective:   Adwoa Clements is a 24 y.o. female who presents for Ear Pain (Both but Right is worse, painful dull ache and clogged. /Will be flying out of the country tomorrow)    Patient presents to the clinic for complaints of bilateral ear pain worse on the right x3 days. Has been progressively getting worse. Pain is a dull ache and comes and goes. Right has a clogged feeling. Right ear a bit muffled.   Going on a flight tomorrow.   Had the flu starting a week ago, now resolving. Still Congested.   Has taken no meds for her symptoms.   Patient denies dizziness/lightheadedness, discharge from ear, external ear pain, fever, chills, vertigo, or headache.     HPI    Review of Systems   Constitutional:  Negative for chills and fever.   HENT:  Positive for congestion and ear pain. Negative for ear discharge, sinus pain and sore throat.    Eyes:  Negative for blurred vision and double vision.   Respiratory:  Negative for cough and shortness of breath.    Cardiovascular:  Negative for chest pain and palpitations.   Gastrointestinal:  Negative for diarrhea, nausea and vomiting.   Genitourinary:  Negative for dysuria.   Musculoskeletal:  Negative for myalgias.   Neurological:  Negative for dizziness, tingling, weakness and headaches.   All other systems reviewed and are negative.    Refer to HPI for additional details.    During this visit, appropriate PPE was worn, and hand hygiene was performed.    PMH:  has a past medical history of ASTHMA.    She has no past medical history of Clotting disorder (HCC) or Diabetes.    MEDS:   Current Outpatient Medications:     fluticasone (FLONASE) 50 MCG/ACT nasal spray, Administer 1 Spray into affected nostril(S) 2 times a day., Disp: 9.9 mL, Rfl: 0    pseudoephedrine (SUDAFED) 30 MG Tab, Take 2 Tablets by mouth every 6 hours as needed for Congestion for up to 10 doses., Disp: 20 Tablet, Rfl: 0    cetirizine (ZYRTEC ALLERGY) 10 MG Tab, Take 1 Tablet by mouth every day., Disp: 30  "Tablet, Rfl: 1    ciprofloxacin/dexamethasone (CIPRODEX) 0.3-0.1 % Suspension, Administer 4 Drops into affected ear(s) 2 times a day for 7 days., Disp: 7.5 mL, Rfl: 0    oseltamivir (TAMIFLU) 75 MG Cap, Take 1 Capsule by mouth 2 times a day. (Patient not taking: Reported on 1/7/2025), Disp: 10 Capsule, Rfl: 0    ALLERGIES:   Allergies   Allergen Reactions    Hydrocodone-Acetaminophen [Hydrocodone-Acetaminophen] Rash     SURGHX:   Past Surgical History:   Procedure Laterality Date    TONSILLECTOMY AND ADENOIDECTOMY  6/1/2011    Performed by JUAN ANTONIO TILLMAN at SURGERY SAME DAY [facility]     SOCHX:  reports that she has never smoked. Her smokeless tobacco use includes snuff. She reports that she does not drink alcohol and does not use drugs.    FH: Per HPI as applicable/pertinent.    Medications, Allergies, and current problem list reviewed today in Epic.     Objective:     /66 (BP Location: Left arm, Patient Position: Sitting, BP Cuff Size: Adult)   Pulse 92   Temp 36.8 °C (98.3 °F) (Temporal)   Resp 18   Ht 1.651 m (5' 5\")   Wt 68 kg (150 lb)   SpO2 98%     Physical Exam  Constitutional:       Appearance: Normal appearance. She is not ill-appearing or toxic-appearing.   HENT:      Head: Normocephalic.      Right Ear: Tympanic membrane normal.      Left Ear: Tympanic membrane, ear canal and external ear normal.      Ears:      Comments: Mild right tragal tenderness to palpation.  No pinna or mastoid process tenderness.  Right ear canal erythematous and swollen.  No discharge or pus.  Right TM WNL.  Left TM, internal, and external ear WNL.     Nose: Nose normal. No congestion or rhinorrhea.      Mouth/Throat:      Mouth: Mucous membranes are moist.      Pharynx: Oropharynx is clear. No oropharyngeal exudate or posterior oropharyngeal erythema.   Eyes:      General:         Right eye: No discharge.         Left eye: No discharge.      Extraocular Movements: Extraocular movements intact.      " Conjunctiva/sclera: Conjunctivae normal.      Pupils: Pupils are equal, round, and reactive to light.   Cardiovascular:      Rate and Rhythm: Normal rate and regular rhythm.      Pulses: Normal pulses.      Heart sounds: Normal heart sounds. No murmur heard.  Pulmonary:      Effort: Pulmonary effort is normal. No respiratory distress.      Breath sounds: Normal breath sounds. No wheezing or rhonchi.   Abdominal:      General: Abdomen is flat.   Musculoskeletal:         General: No signs of injury. Normal range of motion.      Cervical back: Normal range of motion. No rigidity.   Lymphadenopathy:      Cervical: No cervical adenopathy.   Skin:     General: Skin is warm and dry.   Neurological:      General: No focal deficit present.      Mental Status: She is alert and oriented to person, place, and time.      Motor: No weakness.         Assessment/Plan:     Diagnosis and associated orders:     1. Sinus congestion  - fluticasone (FLONASE) 50 MCG/ACT nasal spray; Administer 1 Spray into affected nostril(S) 2 times a day.  Dispense: 9.9 mL; Refill: 0  - pseudoephedrine (SUDAFED) 30 MG Tab; Take 2 Tablets by mouth every 6 hours as needed for Congestion for up to 10 doses.  Dispense: 20 Tablet; Refill: 0  - cetirizine (ZYRTEC ALLERGY) 10 MG Tab; Take 1 Tablet by mouth every day.  Dispense: 30 Tablet; Refill: 1    2. Acute otitis externa of right ear, unspecified type  - ciprofloxacin/dexamethasone (CIPRODEX) 0.3-0.1 % Suspension; Administer 4 Drops into affected ear(s) 2 times a day for 7 days.  Dispense: 7.5 mL; Refill: 0     Comments/MDM:     Discussed that likely etiology of the patient's symptoms is right AOE.  Ciprodex 7-day course prescribed to the patient.  Flonase, Sudafed, and Zyrtec also prescribed for the patient for her sinus congestion. Discussed proper administration and dosing as well as associated adverse/side effects of prescribed medications.  Advised patient to continue OTC pharmacologic and  nonpharmacologic treatment of her symptoms, including but not limited to Tylenol, Motrin, OTC cough and cold/congestion medication, and plenty of rest and fluids.  Patient agreeable to this plan of care.  All questions answered.  Return to clinic if symptoms persist or worsen.  Red flag symptoms warranting emergency medical services discussed, including but not limited to severe ear pain, fever/chills, dizziness/lightheadedness/vertigo, changes in balance or coordination, severe intractable headache, discharge or pus from her ear, sensation of ear closing, hearing loss, and visual changes, N/V/D.         Differential diagnosis, natural history, supportive care, and indications for immediate follow-up discussed.    Advised the patient to follow-up with the primary care physician for recheck, reevaluation, and consideration of further management.    Instructed patient to seek emergency medical attention via calling EMS or going to the Emergency Room for red flag symptoms, including but not limited to: chest pain, palpitations, fever greater than 103F, shortness of breath, wheezing, new or worsened numbness/tingling, focal or unilateral weakness, and bloody vomit/stool.     Please note that this dictation was created using voice recognition software. I have made a reasonable attempt to correct obvious errors, but I expect that there are errors of grammar and possibly content that I did not discover before finalizing the note.    This note was electronically signed by PAGE Goldstein

## 2025-05-06 ENCOUNTER — APPOINTMENT (OUTPATIENT)
Dept: MEDICAL GROUP | Facility: PHYSICIAN GROUP | Age: 25
End: 2025-05-06
Payer: COMMERCIAL

## 2025-05-06 VITALS
SYSTOLIC BLOOD PRESSURE: 108 MMHG | OXYGEN SATURATION: 95 % | HEART RATE: 94 BPM | TEMPERATURE: 97 F | BODY MASS INDEX: 23.82 KG/M2 | HEIGHT: 65 IN | WEIGHT: 143 LBS | DIASTOLIC BLOOD PRESSURE: 68 MMHG | RESPIRATION RATE: 12 BRPM

## 2025-05-06 DIAGNOSIS — G47.00 INSOMNIA, UNSPECIFIED TYPE: ICD-10-CM

## 2025-05-06 DIAGNOSIS — E55.9 VITAMIN D DEFICIENCY: ICD-10-CM

## 2025-05-06 DIAGNOSIS — F41.1 GAD (GENERALIZED ANXIETY DISORDER): ICD-10-CM

## 2025-05-06 DIAGNOSIS — Z13.29 THYROID DISORDER SCREEN: ICD-10-CM

## 2025-05-06 DIAGNOSIS — Z00.00 WELLNESS EXAMINATION: ICD-10-CM

## 2025-05-06 DIAGNOSIS — F33.1 MODERATE EPISODE OF RECURRENT MAJOR DEPRESSIVE DISORDER (HCC): ICD-10-CM

## 2025-05-06 DIAGNOSIS — Z76.89 ENCOUNTER TO ESTABLISH CARE: ICD-10-CM

## 2025-05-06 DIAGNOSIS — Z13.6 SCREENING FOR CARDIOVASCULAR CONDITION: ICD-10-CM

## 2025-05-06 PROBLEM — Z87.42 HISTORY OF ABNORMAL CERVICAL PAP SMEAR: Status: ACTIVE | Noted: 2024-09-19

## 2025-05-06 PROBLEM — F32.9 MAJOR DEPRESSIVE DISORDER: Status: ACTIVE | Noted: 2025-05-06

## 2025-05-06 PROCEDURE — 3074F SYST BP LT 130 MM HG: CPT

## 2025-05-06 PROCEDURE — 3078F DIAST BP <80 MM HG: CPT

## 2025-05-06 PROCEDURE — 99214 OFFICE O/P EST MOD 30 MIN: CPT

## 2025-05-06 RX ORDER — BUSPIRONE HYDROCHLORIDE 5 MG/1
5 TABLET ORAL 3 TIMES DAILY
Qty: 90 TABLET | Refills: 3 | Status: SHIPPED | OUTPATIENT
Start: 2025-05-06

## 2025-05-06 RX ORDER — VITAMIN B COMPLEX
1000 TABLET ORAL DAILY
COMMUNITY

## 2025-05-06 SDOH — ECONOMIC STABILITY: INCOME INSECURITY: HOW HARD IS IT FOR YOU TO PAY FOR THE VERY BASICS LIKE FOOD, HOUSING, MEDICAL CARE, AND HEATING?: NOT VERY HARD

## 2025-05-06 SDOH — ECONOMIC STABILITY: TRANSPORTATION INSECURITY
IN THE PAST 12 MONTHS, HAS THE LACK OF TRANSPORTATION KEPT YOU FROM MEDICAL APPOINTMENTS OR FROM GETTING MEDICATIONS?: NO

## 2025-05-06 SDOH — ECONOMIC STABILITY: INCOME INSECURITY: IN THE LAST 12 MONTHS, WAS THERE A TIME WHEN YOU WERE NOT ABLE TO PAY THE MORTGAGE OR RENT ON TIME?: NO

## 2025-05-06 SDOH — ECONOMIC STABILITY: HOUSING INSECURITY
IN THE LAST 12 MONTHS, WAS THERE A TIME WHEN YOU DID NOT HAVE A STEADY PLACE TO SLEEP OR SLEPT IN A SHELTER (INCLUDING NOW)?: NO

## 2025-05-06 SDOH — HEALTH STABILITY: PHYSICAL HEALTH: ON AVERAGE, HOW MANY MINUTES DO YOU ENGAGE IN EXERCISE AT THIS LEVEL?: 40 MIN

## 2025-05-06 SDOH — HEALTH STABILITY: PHYSICAL HEALTH: ON AVERAGE, HOW MANY DAYS PER WEEK DO YOU ENGAGE IN MODERATE TO STRENUOUS EXERCISE (LIKE A BRISK WALK)?: 3 DAYS

## 2025-05-06 SDOH — ECONOMIC STABILITY: FOOD INSECURITY: WITHIN THE PAST 12 MONTHS, YOU WORRIED THAT YOUR FOOD WOULD RUN OUT BEFORE YOU GOT MONEY TO BUY MORE.: NEVER TRUE

## 2025-05-06 SDOH — ECONOMIC STABILITY: FOOD INSECURITY: WITHIN THE PAST 12 MONTHS, THE FOOD YOU BOUGHT JUST DIDN'T LAST AND YOU DIDN'T HAVE MONEY TO GET MORE.: NEVER TRUE

## 2025-05-06 SDOH — ECONOMIC STABILITY: TRANSPORTATION INSECURITY
IN THE PAST 12 MONTHS, HAS LACK OF TRANSPORTATION KEPT YOU FROM MEETINGS, WORK, OR FROM GETTING THINGS NEEDED FOR DAILY LIVING?: NO

## 2025-05-06 SDOH — HEALTH STABILITY: MENTAL HEALTH
STRESS IS WHEN SOMEONE FEELS TENSE, NERVOUS, ANXIOUS, OR CAN'T SLEEP AT NIGHT BECAUSE THEIR MIND IS TROUBLED. HOW STRESSED ARE YOU?: VERY MUCH

## 2025-05-06 SDOH — ECONOMIC STABILITY: TRANSPORTATION INSECURITY
IN THE PAST 12 MONTHS, HAS LACK OF RELIABLE TRANSPORTATION KEPT YOU FROM MEDICAL APPOINTMENTS, MEETINGS, WORK OR FROM GETTING THINGS NEEDED FOR DAILY LIVING?: NO

## 2025-05-06 ASSESSMENT — ANXIETY QUESTIONNAIRES
4. TROUBLE RELAXING: NEARLY EVERY DAY
7. FEELING AFRAID AS IF SOMETHING AWFUL MIGHT HAPPEN: MORE THAN HALF THE DAYS
6. BECOMING EASILY ANNOYED OR IRRITABLE: NEARLY EVERY DAY
GAD7 TOTAL SCORE: 20
2. NOT BEING ABLE TO STOP OR CONTROL WORRYING: NEARLY EVERY DAY
3. WORRYING TOO MUCH ABOUT DIFFERENT THINGS: NEARLY EVERY DAY
IF YOU CHECKED OFF ANY PROBLEMS ON THIS QUESTIONNAIRE, HOW DIFFICULT HAVE THESE PROBLEMS MADE IT FOR YOU TO DO YOUR WORK, TAKE CARE OF THINGS AT HOME, OR GET ALONG WITH OTHER PEOPLE: VERY DIFFICULT
6. BECOMING EASILY ANNOYED OR IRRITABLE: NEARLY EVERY DAY
GAD7 TOTAL SCORE: 20
4. TROUBLE RELAXING: NEARLY EVERY DAY
5. BEING SO RESTLESS THAT IT IS HARD TO SIT STILL: NEARLY EVERY DAY
3. WORRYING TOO MUCH ABOUT DIFFERENT THINGS: NEARLY EVERY DAY
7. FEELING AFRAID AS IF SOMETHING AWFUL MIGHT HAPPEN: MORE THAN HALF THE DAYS
1. FEELING NERVOUS, ANXIOUS, OR ON EDGE: NEARLY EVERY DAY
2. NOT BEING ABLE TO STOP OR CONTROL WORRYING: NEARLY EVERY DAY
1. FEELING NERVOUS, ANXIOUS, OR ON EDGE: NEARLY EVERY DAY
5. BEING SO RESTLESS THAT IT IS HARD TO SIT STILL: NEARLY EVERY DAY

## 2025-05-06 ASSESSMENT — SOCIAL DETERMINANTS OF HEALTH (SDOH)
IN A TYPICAL WEEK, HOW MANY TIMES DO YOU TALK ON THE PHONE WITH FAMILY, FRIENDS, OR NEIGHBORS?: THREE TIMES A WEEK
DO YOU BELONG TO ANY CLUBS OR ORGANIZATIONS SUCH AS CHURCH GROUPS UNIONS, FRATERNAL OR ATHLETIC GROUPS, OR SCHOOL GROUPS?: NO
ARE YOU MARRIED, WIDOWED, DIVORCED, SEPARATED, NEVER MARRIED, OR LIVING WITH A PARTNER?: LIVING WITH PARTNER
HOW HARD IS IT FOR YOU TO PAY FOR THE VERY BASICS LIKE FOOD, HOUSING, MEDICAL CARE, AND HEATING?: NOT VERY HARD
WITHIN THE PAST 12 MONTHS, YOU WORRIED THAT YOUR FOOD WOULD RUN OUT BEFORE YOU GOT THE MONEY TO BUY MORE: NEVER TRUE
HOW OFTEN DO YOU HAVE SIX OR MORE DRINKS ON ONE OCCASION: NEVER
IN A TYPICAL WEEK, HOW MANY TIMES DO YOU TALK ON THE PHONE WITH FAMILY, FRIENDS, OR NEIGHBORS?: THREE TIMES A WEEK
HOW OFTEN DO YOU ATTEND CHURCH OR RELIGIOUS SERVICES?: 1 TO 4 TIMES PER YEAR
HOW OFTEN DO YOU GET TOGETHER WITH FRIENDS OR RELATIVES?: ONCE A WEEK
HOW OFTEN DO YOU ATTENT MEETINGS OF THE CLUB OR ORGANIZATION YOU BELONG TO?: NEVER
HOW OFTEN DO YOU ATTEND CHURCH OR RELIGIOUS SERVICES?: 1 TO 4 TIMES PER YEAR
ARE YOU MARRIED, WIDOWED, DIVORCED, SEPARATED, NEVER MARRIED, OR LIVING WITH A PARTNER?: LIVING WITH PARTNER
HOW OFTEN DO YOU ATTENT MEETINGS OF THE CLUB OR ORGANIZATION YOU BELONG TO?: NEVER
HOW OFTEN DO YOU GET TOGETHER WITH FRIENDS OR RELATIVES?: ONCE A WEEK
DO YOU BELONG TO ANY CLUBS OR ORGANIZATIONS SUCH AS CHURCH GROUPS UNIONS, FRATERNAL OR ATHLETIC GROUPS, OR SCHOOL GROUPS?: NO
HOW OFTEN DO YOU HAVE A DRINK CONTAINING ALCOHOL: MONTHLY OR LESS
IN THE PAST 12 MONTHS, HAS THE ELECTRIC, GAS, OIL, OR WATER COMPANY THREATENED TO SHUT OFF SERVICE IN YOUR HOME?: NO
HOW MANY DRINKS CONTAINING ALCOHOL DO YOU HAVE ON A TYPICAL DAY WHEN YOU ARE DRINKING: PATIENT DOES NOT DRINK

## 2025-05-06 ASSESSMENT — LIFESTYLE VARIABLES
HOW MANY STANDARD DRINKS CONTAINING ALCOHOL DO YOU HAVE ON A TYPICAL DAY: PATIENT DOES NOT DRINK
SKIP TO QUESTIONS 9-10: 1
AUDIT-C TOTAL SCORE: 1
HOW OFTEN DO YOU HAVE A DRINK CONTAINING ALCOHOL: MONTHLY OR LESS
HOW OFTEN DO YOU HAVE SIX OR MORE DRINKS ON ONE OCCASION: NEVER

## 2025-05-06 ASSESSMENT — ENCOUNTER SYMPTOMS
INSOMNIA: 1
NERVOUS/ANXIOUS: 1
DEPRESSION: 1

## 2025-05-06 ASSESSMENT — PATIENT HEALTH QUESTIONNAIRE - PHQ9
SUM OF ALL RESPONSES TO PHQ QUESTIONS 1-9: 13
5. POOR APPETITE OR OVEREATING: 2 - MORE THAN HALF THE DAYS
CLINICAL INTERPRETATION OF PHQ2 SCORE: 2

## 2025-05-06 NOTE — PROGRESS NOTES
Verbal consent was acquired by the patient to use [employer] ambient listening note generation during this visit     Subjective:     CC: Diagnoses of Encounter to establish care, Moderate episode of recurrent major depressive disorder (HCC), CECILIA (generalized anxiety disorder), Insomnia, unspecified type, Wellness examination, Screening for cardiovascular condition, Thyroid disorder screen, and Vitamin D deficiency were pertinent to this visit.    HPI:   Adwoa presents today with    History of Present Illness  The patient presents for evaluation of anxiety and depression.    Anxiety and Depression  - Reports anxiety as the primary contributor to her depressive symptoms.  - Previously prescribed bupropion was effective initially but caused adverse reactions upon re-administration.  - Zoloft was tried twice, with the second course discontinued due to perceived weight gain.  - Has a past diagnosis of anorexia (ages 18-19) but currently denies restrictive eating behaviors, attributing current weight loss to anxiety-induced appetite suppression.  - Open to new medication options for anxiety.  - A prior anti-anxiety medication caused excessive fatigue, and she has not been on any medication recently.  - Continues therapy after work and uses over-the-counter doxylamine nightly for sleep, which is effective but causes morning grogginess.  - Identifies confrontation and caffeine as anxiety triggers and avoids caffeine.  - Experiences social and performance anxiety, particularly in academic settings, and finds cognitive behavioral therapy techniques helpful.  - Exercises three times weekly and has requested intermittent leave from work due to anxiety-related dissociative episodes affecting productivity.    Pap Smears  - History of abnormal Pap smears, with the most recent in September 2024 yielding normal results.  - Questions the necessity of annual Pap smears given normalized results.    Supplemental information: She is  "not on any medications but takes vitamin D supplements. She has a history of smoking but has quit, reporting improved respiratory function.    PAST SURGICAL HISTORY:  - Tonsillectomy  - Adenoidectomy    SOCIAL HISTORY  Former smoker.    FAMILY HISTORY  Grandmother: Breast cancer. Mother: Skin cancer, prediabetic. Father: Diabetes, hyperlipidemia. Both sets of grandparents: Diabetes. Maternal grandfather: Triple bypass surgery.    Current Outpatient Medications   Medication Sig Dispense Refill    vitamin D3 (CHOLECALCIFEROL) 1000 Unit (25 mcg) Tab Take 1,000 Units by mouth every day.      Doxylamine Succinate, Sleep, (UNISOM PO) Take  by mouth.      busPIRone (BUSPAR) 5 MG tablet Take 1 Tablet by mouth 3 times a day. 90 Tablet 3     No current facility-administered medications for this visit.       Problem   Major Depressive Disorder   David (Generalized Anxiety Disorder)   Insomnia   History of Abnormal Cervical Pap Smear       ROS:  Review of Systems   Psychiatric/Behavioral:  Positive for depression. The patient is nervous/anxious and has insomnia.    All other systems reviewed and are negative.      Objective:     Exam:  /68 (BP Location: Left arm, Patient Position: Sitting, BP Cuff Size: Adult)   Pulse 94   Temp 36.1 °C (97 °F) (Temporal)   Resp 12   Ht 1.651 m (5' 5\")   Wt 64.9 kg (143 lb)   LMP 04/14/2025   SpO2 95%   BMI 23.80 kg/m²  Body mass index is 23.8 kg/m².    Physical Exam  Vitals reviewed.   Constitutional:       General: She is not in acute distress.     Appearance: Normal appearance. She is well-groomed. She is not ill-appearing.   HENT:      Head: Normocephalic and atraumatic.      Right Ear: Tympanic membrane, ear canal and external ear normal.      Left Ear: Tympanic membrane, ear canal and external ear normal.      Nose: Nose normal.      Mouth/Throat:      Mouth: Mucous membranes are moist.      Pharynx: Oropharynx is clear.   Eyes:      Extraocular Movements: Extraocular movements " intact.      Conjunctiva/sclera: Conjunctivae normal.      Pupils: Pupils are equal, round, and reactive to light.   Neck:      Thyroid: No thyromegaly.      Trachea: Trachea normal.   Cardiovascular:      Rate and Rhythm: Normal rate and regular rhythm.      Pulses: Normal pulses.      Heart sounds: Normal heart sounds. No murmur heard.  Pulmonary:      Effort: Pulmonary effort is normal. No respiratory distress.      Breath sounds: Normal breath sounds. No wheezing.   Abdominal:      General: Bowel sounds are normal.   Musculoskeletal:         General: No swelling, tenderness or deformity. Normal range of motion.      Cervical back: Full passive range of motion without pain.   Lymphadenopathy:      Cervical: No cervical adenopathy.   Skin:     General: Skin is warm and dry.      Capillary Refill: Capillary refill takes less than 2 seconds.   Neurological:      General: No focal deficit present.      Mental Status: She is alert and oriented to person, place, and time. Mental status is at baseline.      Cranial Nerves: No cranial nerve deficit.      Sensory: No sensory deficit.      Motor: No weakness.   Psychiatric:         Mood and Affect: Mood normal.         Behavior: Behavior normal.         Assessment & Plan:     25 y.o. female with the following -     Assessment & Plan  1. Anxiety: Ongoing.  - Prescribed BuSpar 5 mg nightly, with potential dose increase to BID after one week if tolerated.  - Continue therapy and cognitive behavioral techniques.  - If BuSpar is ineffective, consider sertraline or fluoxetine.    2. Depression: Stable.  - Monitor mood and report changes.  - Psychiatry referral if depression worsens.  - Continue therapy.    3. Insomnia: Ongoing.  - Using doxylamine nightly, causing grogginess.  - Continue as needed, monitor side effects.  - Consider alternative sleep aids if insomnia persists.    4. Family history of diabetes.  - Order metabolic panel and blood sugar test during September 2025  lab visit.  - Monitor for diabetes or prediabetes.    5. Family history of breast cancer.  - Recommend genetic testing for high-risk breast cancer genes.  - Implement aggressive screening if high-risk genes identified.  -  on early detection and prevention.    6. Health maintenance.  - Low vitamin D levels noted in September 2024; recheck scheduled for September 2025.  - Encourage regular exercise and balanced nutrition.    Follow-up:  - September 2025 or sooner if necessary.    Problem List Items Addressed This Visit       Major depressive disorder    Relevant Medications    busPIRone (BUSPAR) 5 MG tablet    Other Relevant Orders    Patient has been identified as having a positive depression screening. Appropriate orders and counseling have been given. (Completed)    CECILIA (generalized anxiety disorder)    CECILIA-7 Questionnaire  Total:  20  Chronic, unstable. Has tried sertraline, Wellbutrin in the past. Feels the majority of her problem is anxiety not depression.   Will try buspar, consider ssri  Continue CBT         Relevant Medications    busPIRone (BUSPAR) 5 MG tablet    Insomnia     Other Visit Diagnoses         Encounter to establish care        Relevant Orders    Referral to Genetic Research Studies      Wellness examination        Relevant Orders    HEMOGLOBIN A1C    VITAMIN D,25 HYDROXY (DEFICIENCY)    TSH    Comp Metabolic Panel    Lipid Profile      Screening for cardiovascular condition        Relevant Orders    HEMOGLOBIN A1C    Comp Metabolic Panel    Lipid Profile      Thyroid disorder screen        Relevant Orders    TSH      Vitamin D deficiency        Relevant Orders    VITAMIN D,25 HYDROXY (DEFICIENCY)            Patient was educated in proper administration of medication(s) ordered today including safety, possible SE, risks, benefits, rationale and alternatives to therapy.   Supportive care, differential diagnoses, and indications for immediate follow-up discussed with patient.     Pathogenesis of diagnosis discussed including typical length and natural progression.    Instructed to return to clinic or nearest emergency department for any change in condition, further concerns, or worsening of symptoms.  Patient states understanding of the plan of care and discharge instructions.    Return in about 5 months (around 10/6/2025), or if symptoms worsen or fail to improve, for Labs, wellness.    Please note that this dictation was created using voice recognition software. I have made every reasonable attempt to correct obvious errors, but I expect that there are errors of grammar and possibly content that I did not discover before finalizing the note.

## 2025-05-06 NOTE — ASSESSMENT & PLAN NOTE
CECILIA-7 Questionnaire  Total:  20  Chronic, unstable. Has tried sertraline, Wellbutrin in the past. Feels the majority of her problem is anxiety not depression.   Will try buspar, consider ssri  Continue CBT

## 2025-05-13 ENCOUNTER — RESEARCH ENCOUNTER (OUTPATIENT)
Dept: RESEARCH | Facility: MEDICAL CENTER | Age: 25
End: 2025-05-13
Payer: COMMERCIAL

## 2025-05-13 NOTE — RESEARCH NOTE
Patient has been referred by SUDHA Ferrera. The initial referral follow-up message, which includes the consent form link, has been sent to the patient.    Eligible Studies: HNP

## 2025-05-20 ENCOUNTER — APPOINTMENT (OUTPATIENT)
Dept: MEDICAL GROUP | Facility: PHYSICIAN GROUP | Age: 25
End: 2025-05-20
Payer: COMMERCIAL

## 2025-05-22 DIAGNOSIS — Z00.6 CLINICAL TRIAL PARTICIPANT: ICD-10-CM

## 2025-05-22 NOTE — RESEARCH NOTE
Patient has signed the consent form. The applicable research collection order(s) have been created, triggering the scheduling ticket to be sent to the patient via [employer].. Follow-up message has been sent to the patient.    Patient is ready for scheduling.

## 2025-06-03 ENCOUNTER — APPOINTMENT (OUTPATIENT)
Dept: LAB | Facility: MEDICAL CENTER | Age: 25
End: 2025-06-03
Attending: FAMILY MEDICINE

## 2025-06-03 DIAGNOSIS — Z00.6 CLINICAL TRIAL PARTICIPANT: ICD-10-CM

## 2025-06-13 LAB
APOB+LDLR+PCSK9 GENE MUT ANL BLD/T: NOT DETECTED
BRCA1+BRCA2 DEL+DUP + FULL MUT ANL BLD/T: NOT DETECTED
MLH1+MSH2+MSH6+PMS2 GN DEL+DUP+FUL M: NOT DETECTED

## 2025-06-16 ENCOUNTER — RESULTS FOLLOW-UP (OUTPATIENT)
Dept: MEDICAL GROUP | Facility: PHYSICIAN GROUP | Age: 25
End: 2025-06-16

## 2025-10-07 ENCOUNTER — APPOINTMENT (OUTPATIENT)
Dept: MEDICAL GROUP | Facility: PHYSICIAN GROUP | Age: 25
End: 2025-10-07
Payer: COMMERCIAL